# Patient Record
Sex: FEMALE | Race: WHITE | NOT HISPANIC OR LATINO | Employment: OTHER | URBAN - METROPOLITAN AREA
[De-identification: names, ages, dates, MRNs, and addresses within clinical notes are randomized per-mention and may not be internally consistent; named-entity substitution may affect disease eponyms.]

---

## 2018-07-31 ENCOUNTER — TRANSFERRED RECORDS (OUTPATIENT)
Dept: HEALTH INFORMATION MANAGEMENT | Facility: CLINIC | Age: 48
End: 2018-07-31

## 2018-08-02 ENCOUNTER — TRANSFERRED RECORDS (OUTPATIENT)
Dept: HEALTH INFORMATION MANAGEMENT | Facility: CLINIC | Age: 48
End: 2018-08-02

## 2018-08-03 ENCOUNTER — OFFICE VISIT (OUTPATIENT)
Dept: OPHTHALMOLOGY | Facility: CLINIC | Age: 48
End: 2018-08-03
Payer: COMMERCIAL

## 2018-08-03 DIAGNOSIS — S00.201A: ICD-10-CM

## 2018-08-03 DIAGNOSIS — S01.111A LACERATION OF SKIN OF RIGHT EYELID AND PERIOCULAR AREA, INITIAL ENCOUNTER: Primary | ICD-10-CM

## 2018-08-03 ASSESSMENT — VISUAL ACUITY
OS_SC: 20/20
METHOD: SNELLEN - LINEAR
OD_SC: 20/50

## 2018-08-03 ASSESSMENT — SLIT LAMP EXAM - LIDS: COMMENTS: NORMAL

## 2018-08-03 ASSESSMENT — EXTERNAL EXAM - LEFT EYE: OS_EXAM: NORMAL

## 2018-08-03 ASSESSMENT — TONOMETRY
IOP_METHOD: ICARE
OD_IOP_MMHG: 14
OS_IOP_MMHG: 11

## 2018-08-03 ASSESSMENT — CONF VISUAL FIELD
OS_NORMAL: 1
OD_NORMAL: 1

## 2018-08-03 ASSESSMENT — EXTERNAL EXAM - RIGHT EYE: OD_EXAM: NORMAL

## 2018-08-03 NOTE — PROGRESS NOTES
Assessment    Neha Kelly is a 48 year old female with the following diagnoses:   1. Laceration of skin of right eyelid and periocular area, initial encounter - Right Eye    - tarsal fracture right upper eyelid  - Globe intact, cornea clear      PLAN:  -lid laceration repair in clinic  -discussed r/b/a extensively, pt opted to proceed with the procedure today    RTC 1-2weeks post-procedure check      James Sutherland MD, KATINA  Oculofacial Plastics and Orbit Surgery Fellow      Addendum: after procedure and discharge, noted that she has allergic reaction to Azithromycin. Called pt's cell phone, left message instructing patient to discontinue use of Erythromycin as there may be some cross-reactivity, and to use to Refresh PM or Genteal gel instead 2-3x per day.      Attestation:  Complete documentation of historical and exam elements from today's encounter can be found in the full encounter summary report (not reduplicated in this progress note).  I personally obtained the chief complaint(s) and history of present illness.  I confirmed and edited as necessary the review of systems, past medical/surgical history, family history, social history, and examination findings as documented by others; and I examined the patient myself.  I personally reviewed the relevant tests, images, and reports as documented above.  I formulated and edited as necessary the assessment and plan and discussed the findings and management plan with the patient and family. I personally reviewed the ophthalmic test(s) associated with this encounter, agree with the interpretation(s) as documented by the resident/fellow, and have edited the corresponding report(s) as necessary.   -James Sutherland MD, KATINA

## 2018-08-03 NOTE — NURSING NOTE
"Chief Complaints and History of Present Illnesses   Patient presents with     Consult For     lid lacereration RE     HPI    Affected eye(s):  Right   Symptoms:     Blurred vision (Comment: VA gets blurrier as the day goes by)   No tearing   No itching   No burning   Photophobia (Comment: RE hurts more in the bright light)   Eye discharge (Comment: \"alot of discharge\")         Do you have eye pain now?:  Yes   Location:  OD   Pain Level:  Mild Pain (3), Moderate Pain (4)   Pain Duration:  4 days   Pain Frequency:  Constant   Pain Characteristics:  Aching   Other:  \"dull ache\"      Comments:    Patient notes constant dull pain RE, incident happened on 07/31/18, kathye cord to the face    Brianna Guerrero August 3, 2018 1:06 PM               "

## 2018-08-03 NOTE — MR AVS SNAPSHOT
After Visit Summary   8/3/2018    Neha Kelly    MRN: [id number]           Patient Information     Date Of Birth          1970        Visit Information        Provider Department      8/3/2018 1:00 PM Michael Gillespie MD [facility] Ophthalmology        Today's Diagnoses     Laceration of skin of right eyelid and periocular area, initial encounter - Right Eye    -  1    Superficial trauma of eyelid, right, initial encounter           Follow-ups after your visit        Follow-up notes from your care team     Return in about 2 weeks (around 2018) for Dr. Sutherland, same day as [facility].      Your next 10 appointments already scheduled     Aug 27, 2018  8:30 AM CDT   (Arrive by 8:15 AM)   Post-Op with Michael Gillespie MD   [facility]hthalmology ([facility] [place])    [address] [place] [id number] [phone]              Who to contact     Please call your clinic at [phone] to:    Ask questions about your health    Make or cancel appointments    Discuss your medicines    Learn about your test results    Speak to your doctor            Additional Information About Your Visit        MyChart Information     [employer] is an electronic gateway that provides easy, online access to your medical records. With [employer], you can request a clinic appointment, read your test results, renew a prescription or communicate with your care team.     To sign up for [employer] visit the website at [email or web address]   You will be asked to enter the access code listed below, as well as some personal information. Please follow the directions to create your username and password.     Your access code is: [email or web address]  Expires: 2018 12:48 PM     Your access code will  in 90 days. If you need help or a new code, please contact your [facility] or call [phone] for assistance.        Care EveryWhere ID     This is  your Care EveryWhere ID. This could be used by other organizations to access your [place] medical records  [id number]         Blood Pressure from Last 3 Encounters:   04/11/13 99/64   09/13/12 114/76    Weight from Last 3 Encounters:   04/11/13 61.6 kg (135 lb 14.4 oz)   09/13/12 60.7 kg (133 lb 14.4 oz)              We Performed the Following     Repair of Full Thickness Eyelid Defect        Primary Care Provider Office Phone # Fax #    Michael HAMILTON [place] [phone]       [facility] [address] [facility] [address]        Equal Access to Services     [facility]: Hadii wesley martin hadamber Maxwell, waaxda lugabriela, qaybta kaalmada jumana, zach pryor . So [facility] [phone].    ATENCIÓN: Si habla español, tiene a stevens disposición servicios gratuitos de asistencia lingüística. Llame al [phone].    We comply with applicable federal civil rights laws and [place] laws. We do not discriminate on the basis of race, color, national origin, age, disability, sex, sexual orientation, or gender identity.            Thank you!     Thank you for choosing [facility] OPHTHALMOLOGY  for your care. Our goal is always to provide you with excellent care. Hearing back from our patients is one way we can continue to improve our services. Please take a few minutes to complete the written survey that you may receive in the mail after your visit with us. Thank you!             Your Updated Medication List - Protect others around you: Learn how to safely use, store and throw away your medicines at www.disposemymeds.org.          This list is accurate as of 8/3/18  3:07 PM.  Always use your most recent med list.                   Brand Name Dispense Instructions for use Diagnosis    albuterol 108 (90 Base) MCG/ACT Inhaler    PROAIR HFA/PROVENTIL HFA/VENTOLIN HFA     Inhale  into the lungs every 4 hours as needed.        HYDROcodone-acetaminophen 5-325 MG per tablet    NORCO     20 tablet    Take 1-2 tablets by mouth every 6 hours as needed for pain.    Skin cancer of face       ZOMIG PO      Take 5 mg by mouth See Admin Instructions. FOR MIGRAINES

## 2018-08-06 ENCOUNTER — TELEPHONE (OUTPATIENT)
Dept: OPHTHALMOLOGY | Facility: CLINIC | Age: 48
End: 2018-08-06

## 2018-08-06 NOTE — TELEPHONE ENCOUNTER
Telephone call to Neha Kelly    Spoke with patient today. Doing well with no pain, good vision, and no bleeding. All questions were answered and postoperative care was reviewed. She notes some crusting on eyelashes, recommended using artificial tears and warm compresses. Continue lubricating ointment as well.    A postop appointment has been scheduled. Patient will call back with any concerns or questions.    James Sutherland MD  Ophthalmic Plastic and Reconstructive Surgery Fellow

## 2018-08-27 ENCOUNTER — OFFICE VISIT (OUTPATIENT)
Dept: OPHTHALMOLOGY | Facility: CLINIC | Age: 48
End: 2018-08-27
Payer: COMMERCIAL

## 2018-08-27 DIAGNOSIS — S01.111D LACERATION OF SKIN OF RIGHT EYELID AND PERIOCULAR AREA, SUBSEQUENT ENCOUNTER: Primary | ICD-10-CM

## 2018-08-27 ASSESSMENT — TONOMETRY
OD_IOP_MMHG: 15
IOP_METHOD: ICARE
OS_IOP_MMHG: 14

## 2018-08-27 ASSESSMENT — SLIT LAMP EXAM - LIDS: COMMENTS: NORMAL

## 2018-08-27 ASSESSMENT — EXTERNAL EXAM - RIGHT EYE: OD_EXAM: NORMAL

## 2018-08-27 ASSESSMENT — VISUAL ACUITY
OS_CC: 20/20
CORRECTION_TYPE: GLASSES
METHOD: SNELLEN - LINEAR
OD_CC: 20/25

## 2018-08-27 ASSESSMENT — EXTERNAL EXAM - LEFT EYE: OS_EXAM: NORMAL

## 2018-08-27 NOTE — NURSING NOTE
Chief Complaints and History of Present Illnesses   Patient presents with     Follow Up For     lid laceration repair       HPI    Affected eye(s):  Right   Symptoms:     Dryness            Comments:  23 day follow up of lid laceration repair right eye.  Patient says her right eye is very dry.  Continuous headache around right eye everyday has tried migraine medication and it will help with the migraine pain not the headache around her right eye.  Feels that artificial tears are needed often.  Says last stitch of right eye fell out on Monday. Doing well since the procedure.  Eye meds: Blink right eye  Erin Sullivan CO 8/27/2018 8:43 AM

## 2018-08-27 NOTE — PROGRESS NOTES
Assessment    Neha Kelly is a 48 year old female with the following diagnoses:   1. Laceration of skin of right eyelid and periocular area, subsequent encounter - Right Eye    s/p RUL repair  - healing well, residual edema  - eye white and quiet       PLAN:  Continue Warm compresses daily  Continue Artificial Tears  Start FML Ointment to Right upper lid 2x/day x7days    RTC 6-8weeks    James Sutherland MD, KATINA  Oculofacial Plastics and Orbit Surgery Fellow    Attending Physician Attestation:  Complete documentation of historical and exam elements from today's encounter can be found in the full encounter summary report (not reduplicated in this progress note).  I personally obtained the chief complaint(s) and history of present illness.  I confirmed and edited as necessary the review of systems, past medical/surgical history, family history, social history, and examination findings as documented by others; and I examined the patient myself.  I personally reviewed the relevant tests, images, and reports as documented above.  I formulated and edited as necessary the assessment and plan and discussed the findings and management plan with the patient and family. I personally reviewed the ophthalmic test(s) associated with this encounter, agree with the interpretation(s) as documented by the resident/fellow, and have edited the corresponding report(s) as necessary.   -Michael Gillespie MD

## 2018-08-27 NOTE — MR AVS SNAPSHOT
After Visit Summary   2018    Neha Kelly    MRN: [id number]           Patient Information     Date Of Birth          1970        Visit Information        Provider Department      2018 8:30 AM Michael Gillespie MD [facility] Ophthalmology        Today's Diagnoses     Laceration of skin of right eyelid and periocular area, subsequent encounter - Right Eye    -  1       Follow-ups after your visit        Your next 10 appointments already scheduled     Oct 08, 2018  8:30 AM CDT   (Arrive by 8:15 AM)   Post-Op with Michael Gillespie MD   [facility]mology ([facility] [place])    [address] [place] [address] [phone]              Who to contact     Please call your clinic at [phone] to:    Ask questions about your health    Make or cancel appointments    Discuss your medicines    Learn about your test results    Speak to your doctor            Additional Information About Your Visit        MyChart Information     [employer] is an electronic gateway that provides easy, online access to your medical records. With [email or web address], you can request a clinic appointment, read your test results, renew a prescription or communicate with your care team.     To sign up for [employer] visit the website at [email or web address]   You will be asked to enter the access code listed below, as well as some personal information. Please follow the directions to create your username and password.     Your access code is: [email or web address]  Expires: 2018 12:48 PM     Your access code will  in 90 days. If you need help or a new code, please contact your [facility] or call [phone] for assistance.        Care EveryWhere ID     This is your Care EveryWhere ID. This could be used by other organizations to access your [place] medical records  [id number]         Blood Pressure from Last 3 Encounters:   13 99/64    09/13/12 114/76    Weight from Last 3 Encounters:   04/11/13 61.6 kg (135 lb 14.4 oz)   09/13/12 60.7 kg (133 lb 14.4 oz)              Today, you had the following     No orders found for display         Today's Medication Changes          These changes are accurate as of 8/27/18  9:20 AM.  If you have any questions, ask your nurse or doctor.               Start taking these medicines.        Dose/Directions    fluorometholone 0.1 % ophthalmic ointment   Commonly known as:  FML S.O.P.   Used for:  Laceration of skin of right eyelid and periocular area, subsequent encounter   Started by:  Michael Gillespie MD        Dose:  0.5 inch   Place 0.5 inches into the right eye 2 times daily   Quantity:  1 Tube   Refills:  0            Where to get your medicines      Some of these will need a paper prescription and others can be bought over the counter.  Ask your nurse if you have questions.     Bring a paper prescription for each of these medications     fluorometholone 0.1 % ophthalmic ointment                Primary Care Provider Office Phone # Fax #    Michael HAMILTON [place] [phone]       [facility] [address] [facility] [address]        Equal Access to Services     [facility] : Hadii wesley ferraroo Sokeisha, waaxda lugabriela, qaybta kaalmada jumana, zach rogers. So [facility] [phone].    ATENCIÓN: Si habla español, tiene a stevens disposición servicios gratuitos de asistencia lingüística. Llame al [phone].    We comply with applicable federal civil rights laws and [place] laws. We do not discriminate on the basis of race, color, national origin, age, disability, sex, sexual orientation, or gender identity.            Thank you!     Thank you for choosing [facility] OPHTHALMOLOGY  for your care. Our goal is always to provide you with excellent care. Hearing back from our patients is one way we can continue to improve our services. Please take a few minutes  to complete the written survey that you may receive in the mail after your visit with us. Thank you!             Your Updated Medication List - Protect others around you: Learn how to safely use, store and throw away your medicines at www.disposemymeds.org.          This list is accurate as of 8/27/18  9:20 AM.  Always use your most recent med list.                   Brand Name Dispense Instructions for use Diagnosis    albuterol 108 (90 Base) MCG/ACT inhaler    PROAIR HFA/PROVENTIL HFA/VENTOLIN HFA     Inhale  into the lungs every 4 hours as needed.        fluorometholone 0.1 % ophthalmic ointment    FML S.O.P.    1 Tube    Place 0.5 inches into the right eye 2 times daily    Laceration of skin of right eyelid and periocular area, subsequent encounter       HYDROcodone-acetaminophen 5-325 MG per tablet    NORCO    20 tablet    Take 1-2 tablets by mouth every 6 hours as needed for pain.    Skin cancer of face       ZOMIG PO      Take 5 mg by mouth See Admin Instructions. FOR MIGRAINES

## 2018-10-09 ENCOUNTER — TELEPHONE (OUTPATIENT)
Dept: OPHTHALMOLOGY | Facility: CLINIC | Age: 48
End: 2018-10-09

## 2023-04-14 ENCOUNTER — TRANSCRIBE ORDERS (OUTPATIENT)
Dept: OTHER | Age: 53
End: 2023-04-14

## 2023-04-14 DIAGNOSIS — K76.0 FATTY LIVER: Primary | ICD-10-CM

## 2023-06-30 NOTE — CONFIDENTIAL NOTE
DIAGNOSIS: Fatty liver   Appt Date: 09.25.2023    NOTES STATUS DETAILS   OFFICE NOTE from referring provider Care Everywhere 04.02.2023  Katrin Cruz DO @ [facility]   OFFICE NOTES from other specialists     DISCHARGE SUMMARY from hospital     MEDICATION LIST Care Everywhere    LIVER BIOSPY (IF APPLICABLE)      PATHOLOGY REPORTS      IMAGING     ENDOSCOPY (IF AVAILABLE)     COLONOSCOPY (IF AVAILABLE)     ULTRASOUND LIVER     CT OF ABDOMEN     MRI OF LIVER     FIBROSCAN, US ELASTOGRAPHY, FIBROSIS SCAN, MR ELASTOGRAPHY Care Everywhere 05.18.2023 US ELASTOGRAPHY WITH US ABDOMEN LIMITED     LABS     HEPATIC PANEL (LIVER PANEL) Care Everywhere 05.12.2023   BASIC METABOLIC PANEL Care Everywhere 03.09.2023   COMPLETE METABOLIC PANEL Care Everywhere 05.12.2023   COMPLETE BLOOD COUNT (CBC) Care Everywhere 05.12.2023   INTERNATIONAL NORMALIZED RATIO (INR)     HEPATITIS C ANTIBODY     HEPATITIS C VIRAL LOAD/PCR     HEPATITIS C GENOTYPE     HEPATITIS B SURFACE ANTIGEN     HEPATITIS B SURFACE ANTIBODY     HEPATITIS B DNA QUANT LEVEL     HEPATITIS B CORE ANTIBODY       Action 06.30.2023 RM    Action Taken Pending records and image     Action 08.07.2023 RM   Action Taken Called Tomás to get image pushed called [phone] [facility] to get image pushed.     Action 08.31.2023 RM   Action Taken Image received from Tomás and uploaded to chart records in Care Everywhere.

## 2023-09-15 ENCOUNTER — TELEPHONE (OUTPATIENT)
Dept: GASTROENTEROLOGY | Facility: CLINIC | Age: 53
End: 2023-09-15
Payer: COMMERCIAL

## 2023-09-25 ENCOUNTER — PRE VISIT (OUTPATIENT)
Dept: GASTROENTEROLOGY | Facility: CLINIC | Age: 53
End: 2023-09-25
Payer: COMMERCIAL

## 2023-09-27 ENCOUNTER — OFFICE VISIT (OUTPATIENT)
Dept: GASTROENTEROLOGY | Facility: CLINIC | Age: 53
End: 2023-09-27
Attending: FAMILY MEDICINE
Payer: COMMERCIAL

## 2023-09-27 VITALS
RESPIRATION RATE: 18 BRPM | TEMPERATURE: 98 F | BODY MASS INDEX: 28.95 KG/M2 | DIASTOLIC BLOOD PRESSURE: 77 MMHG | WEIGHT: 157.3 LBS | HEIGHT: 62 IN | OXYGEN SATURATION: 99 % | HEART RATE: 85 BPM | SYSTOLIC BLOOD PRESSURE: 130 MMHG

## 2023-09-27 DIAGNOSIS — K76.0 FATTY LIVER: ICD-10-CM

## 2023-09-27 PROCEDURE — G0463 HOSPITAL OUTPT CLINIC VISIT: HCPCS | Performed by: INTERNAL MEDICINE

## 2023-09-27 PROCEDURE — 99204 OFFICE O/P NEW MOD 45 MIN: CPT | Performed by: INTERNAL MEDICINE

## 2023-09-27 RX ORDER — ESTRADIOL 0.5 MG/1
0.5 TABLET ORAL DAILY
COMMUNITY
Start: 2023-08-04

## 2023-09-27 RX ORDER — PHENTERMINE HYDROCHLORIDE 15 MG/1
15 CAPSULE ORAL DAILY
COMMUNITY
Start: 2023-08-04

## 2023-09-27 ASSESSMENT — PAIN SCALES - GENERAL: PAINLEVEL: NO PAIN (0)

## 2023-09-27 NOTE — NURSING NOTE
"Chief Complaint   Patient presents with    Consult     Fatty liver     Vital signs:  Temp: 98  F (36.7  C) Temp src: Oral BP: 130/77 Pulse: 85   Resp: 18 SpO2: 99 %     Height: 157.5 cm (5' 2.01\") Weight: 71.4 kg (157 lb 4.8 oz)  Estimated body mass index is 28.76 kg/m  as calculated from the following:    Height as of this encounter: 1.575 m (5' 2.01\").    Weight as of this encounter: 71.4 kg (157 lb 4.8 oz).      Savanna Greenberg, [facility]  9/27/2023 3:16 PM    "

## 2023-09-27 NOTE — PROGRESS NOTES
[facility] Hepatology    New Patient Visit    Referring provider:  Katrin Cruz    53 year old female    Chief complaint:  Abnormal liver function tests, fatty liver disease    HPI:  Patient presents for further evaluation and management of abnormal liver function tests.  Abdominal ultrasound in May 2023 showed hepatic steatosis and F 0-1 fibrosis.  Testing for hepatitis C at that time was negative.    Patient is well today.  She denies any symptoms related to liver disease.    Patient denies jaundice, abdominal distension, lower extremity edema, lethargy or confusion.    No history of melena, hematemesis or hematochezia.    Patient denies fevers, sweats or chills.     Patient reports weight gain over the past 12 months.  She reports that her normal weight is around 120 pounds.  Her weight peaked at 171 pounds in May 2023.  Patient currently weighs 157 pounds.  Appetite is normal.    History of recent weight gain noted.  Family history of CAD, CVA and diabetes noted.  No known history of CAD, CVA, PAD, GEOVANY, hypertension, hyperlipidemia or diabetes noted.    Patient drinks 2 glasses of wine once per week.  No history of DUIs or alcohol use disorder.    Patient is never smoked.  She denies any history of illicit or recreational drug use including marijuana, IN or IV drugs.    Medical hx Surgical hx   Past Medical History:   Diagnosis Date    Allergic rhinitis     Eczema     Extrinsic asthma, unspecified     SEASONAL    History of basal cell cancer     Migraine       Past Surgical History:   Procedure Laterality Date    EXCISE LESION LIP  9/13/2012    Procedure: EXCISE LESION LIP;  CLOSURE UPPER LIP DEFECT ;  Surgeon: Elena Yepez MD;  Location: [facility]    GYN SURGERY      D&C /2 C-SECTIONS/OVARIAN CYST    ORTHOPEDIC SURGERY      RIGHT CARPAL TUNNEL    REVISE SCAR FACE  4/11/2013    Procedure: REVISE SCAR FACE;  REVISION UPPER LIP SCAR;  Surgeon: Elena Yepez MD;  Location: [facility]       "    Medications  Current Outpatient Medications   Medication Sig Dispense Refill    albuterol (PROVENTIL HFA: VENTOLIN HFA) 108 (90 BASE) MCG/ACT inhaler Inhale  into the lungs every 4 hours as needed.        CALCIUM PO Take by mouth daily      estradiol (ESTRACE) 0.5 MG tablet Take 0.5 mg by mouth daily      Omega-3 Fatty Acids (FISH OIL PO) Take by mouth daily      phentermine (ADIPEX-P) 15 MG capsule Take 15 mg by mouth daily      ZOLMitriptan (ZOMIG PO) Take 5 mg by mouth See Admin Instructions. FOR MIGRAINES          Allergies  Allergies   Allergen Reactions    Azithromycin Anaphylaxis    Amoxicillin Other (See Comments)     Comment: GI problems, Description:     Augmentin [Amoxicillin-Pot Clavulanate] GI Disturbance    Clavulanic Acid Other (See Comments)     Comment: GI problems, Description:        Family hx Social hx   Family History   Problem Relation Age of Onset    Coronary Artery Disease Mother     Cerebrovascular Disease Mother     Cerebrovascular Disease Maternal Grandmother     Diabetes Maternal Grandfather     Cerebrovascular Disease Maternal Grandfather     Glaucoma No family hx of     Macular Degeneration No family hx of     Liver Disease No family hx of     Colon Cancer No family hx of       Social History     Tobacco Use    Smoking status: Never    Smokeless tobacco: Never   Substance Use Topics    Alcohol use: Yes     Comment: OCC.    Drug use: No     Patient lives in [place] with her .  She has 2 children ages 19 and 21, who are both healthy.  Patient works as a realtor.     Review of systems  A 10-point review of systems was negative.    Examination  /77 (BP Location: Left arm, Patient Position: Sitting, Cuff Size: Adult Regular)   Pulse 85   Temp 98  F (36.7  C) (Oral)   Resp 18   Ht 1.575 m (5' 2.01\")   Wt 71.4 kg (157 lb 4.8 oz)   SpO2 99%   BMI 28.76 kg/m    Body mass index is 28.76 kg/m .    Gen- well, NAD, A+Ox3, normal color  Eye- EOMI  ENT- MMM, normal " oropharynx  Lym- no palpable lymphadenopathy  CVS- S1, S2 normal, no added sounds, RRR  RS- CTA  Abd- overweight, soft, non-tender, no ascites or organomegaly on palpation or percussion, BS+  Extr- pulses good, no TANNER  MS- hands normal- no clubbing  Neuro- A+Ox3, no asterixis  Skin- no rash or jaundice  Psych- normal mood    Laboratory  May 2023  TB 0.5, ALT 73, AST 32, AlkPh 71    TP 7.2, Alb 4.3    WCC 5.7, hgb 13.6, plt 234    hgbA1c= 5.5%    HCV Ab neg    Radiology  US elastography May 2023 reviewed    Assessment  53 year old female who presents for further evaluation and management of abnormal liver function test secondary to MASLD.  No evidence of advanced hepatic fibrosis on ultrasound elastography.  No additional testing required.  Patient can follow-up with PCP.    We discussed the pathophysiology, complications and management of MASLD, which is primarily through weight loss with diet and exercise as well as risk factor modification.    Plan  Weight loss with diet and exercise  Recheck LFTs in 2 months  No additional testing required at this time  Follow-up with PCP    Derian Wiseman MD  Hepatology  [facility]    I spent 40 minutes on the date of the encounter doing chart review, history and exam, documentation and further activities as noted above.

## (undated) RX ORDER — LIDOCAINE HYDROCHLORIDE AND EPINEPHRINE 10; 10 MG/ML; UG/ML
INJECTION, SOLUTION INFILTRATION; PERINEURAL
Status: DISPENSED
Start: 2018-08-03

## (undated) RX ORDER — ERYTHROMYCIN 5 MG/G
OINTMENT OPHTHALMIC
Status: DISPENSED
Start: 2018-08-03